# Patient Record
Sex: FEMALE
[De-identification: names, ages, dates, MRNs, and addresses within clinical notes are randomized per-mention and may not be internally consistent; named-entity substitution may affect disease eponyms.]

---

## 2018-02-01 ENCOUNTER — HOSPITAL ENCOUNTER (EMERGENCY)
Dept: HOSPITAL 92 - ERS | Age: 73
Discharge: HOME | End: 2018-02-01
Payer: COMMERCIAL

## 2018-02-01 DIAGNOSIS — J06.9: Primary | ICD-10-CM

## 2018-02-01 DIAGNOSIS — F32.9: ICD-10-CM

## 2018-02-01 DIAGNOSIS — Z86.73: ICD-10-CM

## 2018-02-01 DIAGNOSIS — F17.210: ICD-10-CM

## 2018-02-01 DIAGNOSIS — I10: ICD-10-CM

## 2018-02-01 PROCEDURE — 94640 AIRWAY INHALATION TREATMENT: CPT

## 2018-02-01 PROCEDURE — 71045 X-RAY EXAM CHEST 1 VIEW: CPT

## 2018-02-01 PROCEDURE — 87804 INFLUENZA ASSAY W/OPTIC: CPT

## 2018-02-01 NOTE — RAD
SINGLE VIEW OF THE CHEST:

 

HISTORY:

Dizziness and nausea.

 

COMPARISON: 

05/29/2017

 

FINDINGS:

A single view of the chest shows a normal sized cardiomediastinal silhouette.  There is no evidence o
f consolidation, mass, or pleural effusion.  Degenerative changes are seen in the spine.  The patient
 is status post right shoulder arthroplasty.

 

IMPRESSION:

No evidence of acute cardiopulmonary disease.

 

POS: [facility]

## 2019-03-23 ENCOUNTER — HOSPITAL ENCOUNTER (EMERGENCY)
Dept: HOSPITAL 92 - ERS | Age: 74
LOS: 1 days | Discharge: ANOTHER HOSPITAL | End: 2019-03-24
Payer: MEDICARE

## 2019-03-23 DIAGNOSIS — F32.9: ICD-10-CM

## 2019-03-23 DIAGNOSIS — N39.0: Primary | ICD-10-CM

## 2019-03-23 DIAGNOSIS — F17.210: ICD-10-CM

## 2019-03-23 DIAGNOSIS — I10: ICD-10-CM

## 2019-03-23 LAB
ALBUMIN SERPL BCG-MCNC: 4.1 G/DL (ref 3.4–4.8)
ALP SERPL-CCNC: 104 U/L (ref 40–150)
ALT SERPL W P-5'-P-CCNC: 8 U/L (ref 8–55)
ANION GAP SERPL CALC-SCNC: 12 MMOL/L (ref 10–20)
AST SERPL-CCNC: 12 U/L (ref 5–34)
BACTERIA UR QL AUTO: (no result) HPF
BASOPHILS # BLD AUTO: 0.1 THOU/UL (ref 0–0.2)
BASOPHILS NFR BLD AUTO: 0.9 % (ref 0–1)
BILIRUB SERPL-MCNC: 0.3 MG/DL (ref 0.2–1.2)
BUN SERPL-MCNC: 36 MG/DL (ref 9.8–20.1)
CALCIUM SERPL-MCNC: 9.3 MG/DL (ref 7.8–10.44)
CHLORIDE SERPL-SCNC: 108 MMOL/L (ref 98–107)
CO2 SERPL-SCNC: 23 MMOL/L (ref 23–31)
CREAT CL PREDICTED SERPL C-G-VRATE: 0 ML/MIN (ref 70–130)
CRYSTAL-AUWI FLAG: 0 (ref 0–15)
EOSINOPHIL # BLD AUTO: 0.3 THOU/UL (ref 0–0.7)
EOSINOPHIL NFR BLD AUTO: 3.2 % (ref 0–10)
GLOBULIN SER CALC-MCNC: 3 G/DL (ref 2.4–3.5)
GLUCOSE SERPL-MCNC: 101 MG/DL (ref 83–110)
HEV IGM SER QL: 0 (ref 0–7.99)
HGB BLD-MCNC: 12.8 G/DL (ref 12–16)
HYALINE CASTS #/AREA URNS LPF: (no result) LPF
LYMPHOCYTES # BLD: 2.3 THOU/UL (ref 1.2–3.4)
LYMPHOCYTES NFR BLD AUTO: 22.8 % (ref 21–51)
MCH RBC QN AUTO: 29.5 PG (ref 27–31)
MCV RBC AUTO: 92.7 FL (ref 78–98)
MONOCYTES # BLD AUTO: 0.6 THOU/UL (ref 0.11–0.59)
MONOCYTES NFR BLD AUTO: 6.1 % (ref 0–10)
NEUTROPHILS # BLD AUTO: 6.8 THOU/UL (ref 1.4–6.5)
NEUTROPHILS NFR BLD AUTO: 67.1 % (ref 42–75)
PATHC CAST-AUWI FLAG: 0.54 (ref 0–2.49)
PLATELET # BLD AUTO: 284 THOU/UL (ref 130–400)
POTASSIUM SERPL-SCNC: 4.6 MMOL/L (ref 3.5–5.1)
RBC # BLD AUTO: 4.33 MILL/UL (ref 4.2–5.4)
RBC UR QL AUTO: (no result) HPF (ref 0–3)
SODIUM SERPL-SCNC: 138 MMOL/L (ref 136–145)
SP GR UR STRIP: 1.02 (ref 1–1.04)
SPERM-AUWI FLAG: 0 (ref 0–9.9)
WBC # BLD AUTO: 10.1 THOU/UL (ref 4.8–10.8)
WBC UR QL AUTO: (no result) HPF (ref 0–3)
YEAST-AUWI FLAG: 0 (ref 0–25)

## 2019-03-23 PROCEDURE — 96365 THER/PROPH/DIAG IV INF INIT: CPT

## 2019-03-23 PROCEDURE — 81003 URINALYSIS AUTO W/O SCOPE: CPT

## 2019-03-23 PROCEDURE — 96375 TX/PRO/DX INJ NEW DRUG ADDON: CPT

## 2019-03-23 PROCEDURE — 36415 COLL VENOUS BLD VENIPUNCTURE: CPT

## 2019-03-23 PROCEDURE — 85025 COMPLETE CBC W/AUTO DIFF WBC: CPT

## 2019-03-23 PROCEDURE — 87186 SC STD MICRODIL/AGAR DIL: CPT

## 2019-03-23 PROCEDURE — 93005 ELECTROCARDIOGRAM TRACING: CPT

## 2019-03-23 PROCEDURE — 87086 URINE CULTURE/COLONY COUNT: CPT

## 2019-03-23 PROCEDURE — 96361 HYDRATE IV INFUSION ADD-ON: CPT

## 2019-03-23 PROCEDURE — 80053 COMPREHEN METABOLIC PANEL: CPT

## 2019-03-23 PROCEDURE — 74176 CT ABD & PELVIS W/O CONTRAST: CPT

## 2019-03-23 PROCEDURE — 87077 CULTURE AEROBIC IDENTIFY: CPT

## 2019-03-23 PROCEDURE — 81015 MICROSCOPIC EXAM OF URINE: CPT

## 2019-03-23 PROCEDURE — 84484 ASSAY OF TROPONIN QUANT: CPT

## 2019-03-23 PROCEDURE — 71045 X-RAY EXAM CHEST 1 VIEW: CPT

## 2019-03-23 PROCEDURE — 76705 ECHO EXAM OF ABDOMEN: CPT

## 2019-03-24 NOTE — ULT
RIGHT UPPER QUADRANT ULTRASOUND:

 

DATE: 3/23/2019.

 

COMPARISON: 

None.

 

HISTORY: 

Right flank pain.

 

TECHNIQUE: 

Multiplanar, gray scale, sonographic imaging of the right upper quadrant provided.

 

FINDINGS: 

Imaged pancreas appears grossly unremarkable.  The distal body and tail are obscured by bowel gas.  M
ild prominence of pancreatic duct noted of uncertain clinical significance.  No focal mass lesion or 
intrahepatic biliary dilatation noted within the hepatic parenchyma.  Gallbladder wall is normal in t
hickness with no pericholecystic fluid or gallstones.

 

The common bile duct is upper limits of normal for size, measuring between 6 and 7 mm.  The right chante robb measures 10 cm in craniocaudal dimension.  Question trace fluid adjacent to the right kidney.  No
 sonographic evidence for hydronephrosis, renal stone, or renal mass lesion.  The sonographer reports
 a negative Del Valle's sign.

 

IMPRESSION: 

No evidence for cholelithiasis, cholecystitis, or biliary dilatation.  Question trace perinephric flu
id, which may be associated with inflammatory change of the right kidney.  Clinical correlation requi
red.

 

POS: [facility]

## 2019-03-24 NOTE — CT
CT OF THE ABDOMEN AND PELVIS WITHOUT IV CONTRAST:

 

INDICATION: 

Right upper quadrant abdominal pain.

 

COMPARISON: 

Prior exam dated 5/29/2017.

 

FINDINGS: 

No renal or ureteral calculus is evident.  No hydronephrosis is evident.  There is bilateral fetal lo
bulation.  There is bibasilar atelectasis.  An unopacified liver, pancreas, adrenal glands, and splee
n appear within normal limits.  There is a mild amount of retained stool within the colon.  There is 
a normal appendix in the right lower quadrant.  Visualized bladder is unremarkable.  There is a gas-f
illed pessary within the lower vagina.  The rectum and perirectal soft tissues are unremarkable.  The
re is diffuse osteopenia and scattered degenerative change.  No definite acute osseous abnormality is
 evident.

 

IMPRESSION: 

No CT explanation for the patient's abdominal pain.

 

POS: [facility]

## 2019-03-24 NOTE — RAD
FRONTAL RADIOGRAPH CHEST:

 

DATE: 3/23/2019.

 

COMPARISON: 

2/1/2018.

 

HISTORY: 

Right flank pain.

 

FINDINGS: 

Cardiac silhouette appears prominent, a stable finding.  There is a right shoulder arthroplasty.  The
re is increased linear interstitial density with pulmonary hyperinflation suggesting air trapping.  Q
uestion a history of COPD.

 

IMPRESSION: 

Stable appearance of the chest - no acute findings.

 

POS: [facility]

## 2019-04-18 ENCOUNTER — HOSPITAL ENCOUNTER (EMERGENCY)
Dept: HOSPITAL 92 - ERS | Age: 74
Discharge: HOME | End: 2019-04-18
Payer: MEDICARE
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
## 2019-04-18 DIAGNOSIS — S40.011A: ICD-10-CM

## 2019-04-18 DIAGNOSIS — Z86.73: ICD-10-CM

## 2019-04-18 DIAGNOSIS — F32.9: ICD-10-CM

## 2019-04-18 DIAGNOSIS — S20.219A: Primary | ICD-10-CM

## 2019-04-18 DIAGNOSIS — I10: ICD-10-CM

## 2019-04-18 DIAGNOSIS — F17.210: ICD-10-CM

## 2019-04-18 PROCEDURE — 96372 THER/PROPH/DIAG INJ SC/IM: CPT

## 2019-04-18 PROCEDURE — 71046 X-RAY EXAM CHEST 2 VIEWS: CPT

## 2019-04-18 NOTE — RAD
XR Shoulder Rt 3 View STANDARD: 4/18/2019 12:00 PM



CLINICAL INDICATION: Pain, injury



COMPARISON: None.



FINDINGS:

Fracture:No fracture.

Arthropathy:Postoperative right shoulder in place. Moderate osteoarthritis of right AC joint.

Incidental findings:None of significance.



  

IMPRESSION: 



1. No acute osseous abnormality. 



Reported By: Curly Rico 

Electronically Signed:  4/18/2019 12:49 PM

## 2019-04-18 NOTE — RAD
Exam: Chest 2 views



HISTORY:Injury, pain



Comparison: None



FINDINGS:



Lungs: Hyperinflated with interstitial prominence bilaterally

Cardiac silhouette:Enlarged

Pulmonary vessels: Mild engorgement

Pleural Spaces: Clear

Pneumothorax: None



Osseous abnormalities: None of acuity.



IMPRESSION: 

COPD



Mild CHF



Reported By: Curly Rico 

Electronically Signed:  4/18/2019 12:50 PM

## 2019-08-19 ENCOUNTER — HOSPITAL ENCOUNTER (OUTPATIENT)
Dept: HOSPITAL 92 - ERS | Age: 74
Setting detail: OBSERVATION
LOS: 2 days | Discharge: HOME | End: 2019-08-21
Attending: HOSPITALIST | Admitting: HOSPITALIST
Payer: MEDICARE

## 2019-08-19 VITALS — BODY MASS INDEX: 29.5 KG/M2

## 2019-08-19 DIAGNOSIS — Z91.81: ICD-10-CM

## 2019-08-19 DIAGNOSIS — Z86.73: ICD-10-CM

## 2019-08-19 DIAGNOSIS — F17.210: ICD-10-CM

## 2019-08-19 DIAGNOSIS — E78.5: ICD-10-CM

## 2019-08-19 DIAGNOSIS — Z79.899: ICD-10-CM

## 2019-08-19 DIAGNOSIS — F32.9: ICD-10-CM

## 2019-08-19 DIAGNOSIS — I16.0: Primary | ICD-10-CM

## 2019-08-19 DIAGNOSIS — I10: ICD-10-CM

## 2019-08-19 LAB
ALBUMIN SERPL BCG-MCNC: 4.2 G/DL (ref 3.4–4.8)
ALP SERPL-CCNC: 109 U/L (ref 40–150)
ALT SERPL W P-5'-P-CCNC: 12 U/L (ref 8–55)
ANION GAP SERPL CALC-SCNC: 16 MMOL/L (ref 10–20)
AST SERPL-CCNC: 21 U/L (ref 5–34)
BASOPHILS # BLD AUTO: 0 THOU/UL (ref 0–0.2)
BASOPHILS NFR BLD AUTO: 0.6 % (ref 0–1)
BILIRUB SERPL-MCNC: 0.3 MG/DL (ref 0.2–1.2)
BUN SERPL-MCNC: 23 MG/DL (ref 9.8–20.1)
CALCIUM SERPL-MCNC: 9.2 MG/DL (ref 7.8–10.44)
CHLORIDE SERPL-SCNC: 109 MMOL/L (ref 98–107)
CO2 SERPL-SCNC: 20 MMOL/L (ref 23–31)
CREAT CL PREDICTED SERPL C-G-VRATE: 0 ML/MIN (ref 70–130)
EOSINOPHIL # BLD AUTO: 0.1 THOU/UL (ref 0–0.7)
EOSINOPHIL NFR BLD AUTO: 1.8 % (ref 0–10)
GLOBULIN SER CALC-MCNC: 3.2 G/DL (ref 2.4–3.5)
GLUCOSE SERPL-MCNC: 86 MG/DL (ref 83–110)
HGB BLD-MCNC: 13.9 G/DL (ref 12–16)
LYMPHOCYTES # BLD: 1.6 THOU/UL (ref 1.2–3.4)
LYMPHOCYTES NFR BLD AUTO: 19.8 % (ref 21–51)
MCH RBC QN AUTO: 31.9 PG (ref 27–31)
MCV RBC AUTO: 95.7 FL (ref 78–98)
MONOCYTES # BLD AUTO: 0.5 THOU/UL (ref 0.11–0.59)
MONOCYTES NFR BLD AUTO: 6.1 % (ref 0–10)
NEUTROPHILS # BLD AUTO: 5.9 THOU/UL (ref 1.4–6.5)
NEUTROPHILS NFR BLD AUTO: 71.7 % (ref 42–75)
PLATELET # BLD AUTO: 270 THOU/UL (ref 130–400)
POTASSIUM SERPL-SCNC: 4.4 MMOL/L (ref 3.5–5.1)
RBC # BLD AUTO: 4.37 MILL/UL (ref 4.2–5.4)
SODIUM SERPL-SCNC: 141 MMOL/L (ref 136–145)
WBC # BLD AUTO: 8.3 THOU/UL (ref 4.8–10.8)

## 2019-08-19 PROCEDURE — 97139 UNLISTED THERAPEUTIC PX: CPT

## 2019-08-19 PROCEDURE — 84439 ASSAY OF FREE THYROXINE: CPT

## 2019-08-19 PROCEDURE — 36415 COLL VENOUS BLD VENIPUNCTURE: CPT

## 2019-08-19 PROCEDURE — 80048 BASIC METABOLIC PNL TOTAL CA: CPT

## 2019-08-19 PROCEDURE — 96376 TX/PRO/DX INJ SAME DRUG ADON: CPT

## 2019-08-19 PROCEDURE — 80053 COMPREHEN METABOLIC PANEL: CPT

## 2019-08-19 PROCEDURE — 96374 THER/PROPH/DIAG INJ IV PUSH: CPT

## 2019-08-19 PROCEDURE — 84484 ASSAY OF TROPONIN QUANT: CPT

## 2019-08-19 PROCEDURE — 80061 LIPID PANEL: CPT

## 2019-08-19 PROCEDURE — 99285 EMERGENCY DEPT VISIT HI MDM: CPT

## 2019-08-19 PROCEDURE — G0378 HOSPITAL OBSERVATION PER HR: HCPCS

## 2019-08-19 PROCEDURE — 71275 CT ANGIOGRAPHY CHEST: CPT

## 2019-08-19 PROCEDURE — 96372 THER/PROPH/DIAG INJ SC/IM: CPT

## 2019-08-19 PROCEDURE — 71046 X-RAY EXAM CHEST 2 VIEWS: CPT

## 2019-08-19 PROCEDURE — 82553 CREATINE MB FRACTION: CPT

## 2019-08-19 PROCEDURE — 85025 COMPLETE CBC W/AUTO DIFF WBC: CPT

## 2019-08-19 PROCEDURE — 96375 TX/PRO/DX INJ NEW DRUG ADDON: CPT

## 2019-08-19 PROCEDURE — 84443 ASSAY THYROID STIM HORMONE: CPT

## 2019-08-19 PROCEDURE — 83880 ASSAY OF NATRIURETIC PEPTIDE: CPT

## 2019-08-19 PROCEDURE — 85379 FIBRIN DEGRADATION QUANT: CPT

## 2019-08-19 PROCEDURE — 93005 ELECTROCARDIOGRAM TRACING: CPT

## 2019-08-19 NOTE — CT
CTA Angio Chest W WO Con



8/19/2019 10:53 PM



Indication:  Chest pain and dyspnea



Technique:  Multiple CTA images were obtained of the thorax with IV contrast.  3D reformatted images 
were constructed from the raw data.



Comparison:  None



Findings:



Pulmonary arteries:  No central or segmental pulmonary embolus is evident.



Heart and Great Vessels:  There are mild vascular calcifications involving thoracic aorta and coronar
y arteries.



Lungs:There are areas of subsegmental volume loss within the lingula and right middle lobe. No conflu
ent airspace opacity is evident.



Pleural space:  Clear.



Upper Abdomen:  No acute abnormality.



Osseous Structures:  No acute osseous abnormality.



Impression:

No central or segmental pulmonary embolus.





Reported By: Adán Prieto 

Electronically Signed:  8/19/2019 11:27 PM

## 2019-08-19 NOTE — RAD
EXAM:

Chest Two Views



8/19/2019 6:16 PM



HISTORY:

Fall with chest pain



COMPARISON:

April 18, 2019



FINDINGS:

Heart: Stable mild cardiomegaly

Pulmonary vessels: Normal.

Costophrenic angles: Clear.

Lungs: Stable COPD

Pneumothorax: None.

Osseous structures:Stable right total shoulder prosthesis. There is scattered degenerative and osteoa
rthritic change present. Mild thoracolumbar scoliosis.

Additional findings:   None.



IMPRESSION:

No significant acute intrathoracic disease.



Reported By: Adán Prieto 

Electronically Signed:  8/19/2019 6:17 PM

## 2019-08-20 LAB
ANION GAP SERPL CALC-SCNC: 13 MMOL/L (ref 10–20)
BASOPHILS # BLD AUTO: 0 THOU/UL (ref 0–0.2)
BASOPHILS NFR BLD AUTO: 0.5 % (ref 0–1)
BUN SERPL-MCNC: 19 MG/DL (ref 9.8–20.1)
CALCIUM SERPL-MCNC: 8.6 MG/DL (ref 7.8–10.44)
CHD RISK SERPL-RTO: 1.8 (ref ?–4.5)
CHLORIDE SERPL-SCNC: 106 MMOL/L (ref 98–107)
CHOLEST SERPL-MCNC: 217 MG/DL
CK MB SERPL-MCNC: 1.8 NG/ML (ref 0–6.6)
CO2 SERPL-SCNC: 20 MMOL/L (ref 23–31)
CREAT CL PREDICTED SERPL C-G-VRATE: 67 ML/MIN (ref 70–130)
EOSINOPHIL # BLD AUTO: 0.1 THOU/UL (ref 0–0.7)
EOSINOPHIL NFR BLD AUTO: 1.8 % (ref 0–10)
GLUCOSE SERPL-MCNC: 151 MG/DL (ref 83–110)
HDLC SERPL-MCNC: 121 MG/DL
HGB BLD-MCNC: 13.1 G/DL (ref 12–16)
LDLC SERPL CALC-MCNC: 81 MG/DL
LYMPHOCYTES # BLD: 1 THOU/UL (ref 1.2–3.4)
LYMPHOCYTES NFR BLD AUTO: 14.5 % (ref 21–51)
MCH RBC QN AUTO: 30.2 PG (ref 27–31)
MCV RBC AUTO: 94.2 FL (ref 78–98)
MONOCYTES # BLD AUTO: 0.4 THOU/UL (ref 0.11–0.59)
MONOCYTES NFR BLD AUTO: 5.5 % (ref 0–10)
NEUTROPHILS # BLD AUTO: 5.5 THOU/UL (ref 1.4–6.5)
NEUTROPHILS NFR BLD AUTO: 77.7 % (ref 42–75)
PLATELET # BLD AUTO: 256 THOU/UL (ref 130–400)
POTASSIUM SERPL-SCNC: 3.4 MMOL/L (ref 3.5–5.1)
RBC # BLD AUTO: 4.34 MILL/UL (ref 4.2–5.4)
SODIUM SERPL-SCNC: 136 MMOL/L (ref 136–145)
T4 FREE SERPL-MCNC: 0.95 NG/DL (ref 0.7–1.48)
TRIGL SERPL-MCNC: 62 MG/DL (ref ?–150)
TROPONIN I SERPL DL<=0.01 NG/ML-MCNC: 0.05 NG/ML (ref ?–0.03)
TSH SERPL DL<=0.005 MIU/L-ACNC: 1.41 UIU/ML (ref 0.35–4.94)
WBC # BLD AUTO: 7.1 THOU/UL (ref 4.8–10.8)

## 2019-08-20 RX ADMIN — Medication SCH ML: at 21:14

## 2019-08-21 VITALS — TEMPERATURE: 97.6 F

## 2019-08-21 VITALS — DIASTOLIC BLOOD PRESSURE: 77 MMHG | SYSTOLIC BLOOD PRESSURE: 158 MMHG

## 2019-08-21 LAB
ANION GAP SERPL CALC-SCNC: 13 MMOL/L (ref 10–20)
BASOPHILS # BLD AUTO: 0.1 THOU/UL (ref 0–0.2)
BASOPHILS NFR BLD AUTO: 1.1 % (ref 0–1)
BUN SERPL-MCNC: 13 MG/DL (ref 9.8–20.1)
CALCIUM SERPL-MCNC: 9.2 MG/DL (ref 7.8–10.44)
CHLORIDE SERPL-SCNC: 109 MMOL/L (ref 98–107)
CO2 SERPL-SCNC: 21 MMOL/L (ref 23–31)
CREAT CL PREDICTED SERPL C-G-VRATE: 85 ML/MIN (ref 70–130)
EOSINOPHIL # BLD AUTO: 0.2 THOU/UL (ref 0–0.7)
EOSINOPHIL NFR BLD AUTO: 2.6 % (ref 0–10)
GLUCOSE SERPL-MCNC: 85 MG/DL (ref 83–110)
HGB BLD-MCNC: 14.2 G/DL (ref 12–16)
LYMPHOCYTES # BLD: 1.9 THOU/UL (ref 1.2–3.4)
LYMPHOCYTES NFR BLD AUTO: 26.8 % (ref 21–51)
MCH RBC QN AUTO: 32.5 PG (ref 27–31)
MCV RBC AUTO: 94.7 FL (ref 78–98)
MONOCYTES # BLD AUTO: 0.5 THOU/UL (ref 0.11–0.59)
MONOCYTES NFR BLD AUTO: 6.7 % (ref 0–10)
NEUTROPHILS # BLD AUTO: 4.5 THOU/UL (ref 1.4–6.5)
NEUTROPHILS NFR BLD AUTO: 62.9 % (ref 42–75)
PLATELET # BLD AUTO: 262 THOU/UL (ref 130–400)
POTASSIUM SERPL-SCNC: 4.2 MMOL/L (ref 3.5–5.1)
RBC # BLD AUTO: 4.37 MILL/UL (ref 4.2–5.4)
SODIUM SERPL-SCNC: 139 MMOL/L (ref 136–145)
WBC # BLD AUTO: 7.2 THOU/UL (ref 4.8–10.8)

## 2019-08-21 RX ADMIN — Medication SCH ML: at 09:22

## 2019-08-21 NOTE — HP
PRIMARY CARE PHYSICIAN:  Dr. Vipin Cifuentes.



CHIEF COMPLAINT:  An elevated blood pressure.



HISTORY OF PRESENT ILLNESS:  Ms. Lizzette Knight is a pleasant 74-year-old female

with past medical history of hypertension, hyperlipidemia, and a transient ischemic

attack, who had presented to [facility] late last night.

She states that she had a mechanical fall on Sunday, which she was walking and had

rolled her left ankle where she had suffered a fall secondary to a mechanical fall.

She states when she was on the ground, she had some slight chest pain and some

shortness of breath due to the impact.  However, these symptoms resolved.  She

states that when she and her family were taken to a local clinic, she underwent an

x-ray, which was found to be normal and had showed no fracture.  It was at that time

that the staff noticed that her blood pressure was high and had recommended her to

be seen in the ER.  She then was transported to the local ER at [facility].  There was also determined that her blood pressure is

elevated in the 200s over 100s and got as high as 205/112.  She had received an IV

10 mg push of labetalol, sublingual nitroglycerin, aspirin and 1 inch of transdermal

nitroglycerin paste, which had seemed to help with her symptoms.  The patient denied

any fever, chills, any headache, blurred vision, dizziness, any chest pain,

palpitations, shortness of breath, abdominal pain, nausea, vomiting, or any other

symptoms at that time.  Her serial troponins were trended and found to be normal

originally and had elevated to an indeterminate range of 0.030 and 0.049; however,

did trend down to 0.023 after her blood pressure improved.  The patient had no

further symptoms of chest pain at that time.  However, the D-dimer was slightly

elevated at 0.56.  The CTA of chest was also performed and found no signs of PE at

this time.  A portable chest x-ray was also performed and showed no significant

acute intrathoracic disease.  The patient's TSH and T4 were both normal and all

other lab work was essentially unremarkable.  She was started on IV hydralazine and

IV labetalol as needed for any further elevated blood pressures.  Repeat blood

pressures did improve to the systolic 160s or 170s and then later 136/62. 



REVIEW OF SYSTEMS:  All other systems reviewed and found to be negative unless

mentioned in the HPI. 



PAST MEDICAL HISTORY:  Hypertension, hyperlipidemia, transient ischemic attack.



PAST SURGICAL HISTORY:  Right arm reconstruction and hernia repair in 2000.



PSYCHIATRIC HISTORY:  Includes depression.



SOCIAL HISTORY:  The patient reports drinking roughly 2 to 3 times a week socially

and also states that she smokes about a pack of cigarettes per week; however, denies

any further illicit drug use. 



KNOWN ALLERGIES:  No known drug allergies.



CURRENT HOME MEDICATIONS:  

1. Diltiazem 180 mg p.o. daily.

2. Trazodone 50 mg p.o. at bedtime.

3. Ibuprofen 800 mg p.o. q.8 hours p.r.n. pain.



PHYSICAL EXAMINATION:

VITAL SIGNS:  /62, pulse 78, respirations 20, temperature 97.7, O2 saturation

94% on room air. 

GENERAL:  The patient is awake, alert, and oriented x3.  She is currently lying

comfortably in bed and in no acute distress.  Her family is at bedside. 

HEENT:  Atraumatic, normocephalic.  Pupils are round and reactive to light.

Extraocular muscles intact.  Moist mucous membranes noted. 

NECK:  Soft, supple.  Trachea midline. 

CARDIOVASCULAR:  Positive S1 and S2.  Regular rate and rhythm.  No murmur

auscultated. 

RESPIRATORY:  Clear to auscultation bilaterally.  No wheezes, rales, or rhonchi. 

ABDOMEN:  Soft, nontender.  Bowel sounds present. 

MUSCULOSKELETAL:  Strength 5+ bilaterally upper and lower extremities.  Moves all

extremities equal. Left ankle is nontender to palpation.  No edema noted. 

NEUROLOGIC:  Cranial nerves 2 through 12 grossly intact.  No focal deficits noted.

Speech intact and normal.  Gait not assessed. 

SKIN:  Warm, dry, and intact.  No rashes.  No ulceration noted. 

PSYCHIATRIC:  Good mood and affect.



LABORATORY DATA:  WBC 27.1, RBC 4.34, hemoglobin 13.1, platelets 256.  D-dimer 0.56.

 Sodium 136, potassium 3.4, anion gap 13, BUN 19, creatinine 0.80, estimated GFR is

70, glucose 151.  Troponin 0.023, 0.030, 0.049, 0.023.  BNP 56.9, CK-MB 1.8,

triglycerides 62, cholesterol 217, LDL 81, , free T4 0.95.  TSH 1.4062. 



DIAGNOSTIC IMAGING:  Two-view chest x-ray shows no significant acute changes.  CTA

of chest showed no signs of PE at this time. 



ASSESSMENT AND PLAN:  

1. Hypertensive urgency.  She will be resumed on her home regimen at this time.  Her

blood pressure and other vital signs will be monitored closely and she will also be

treated with IV hydralazine and labetalol as needed for any further elevated blood

pressures, these have been trending down and have been normalized.  She will be

likely monitored overnight and if she is stable tomorrow, she will likely be

discharged in the morning.  She is currently asymptomatic at this time and her

troponins also trended down.  She has no longer had any further chest pain,

palpitations, or shortness of breath and normal EKG.  Therefore, likely no further

cardiac workup at this time is needed. 

2. History of hypertension as above.

3. Hyperlipidemia.

4. Deep venous thrombosis and gastrointestinal prophylaxis.

5. Code status, full code.  

6. Surrogate decision maker is her daughter, Gianluca.



DISPOSITION:  Pending further workup and clinical findings.







Job ID:  [id number]

## 2019-08-24 NOTE — EKG
Test Reason : CP

Blood Pressure : ***/*** mmHG

Vent. Rate : 080 BPM     Atrial Rate : 080 BPM

   P-R Int : 178 ms          QRS Dur : 090 ms

    QT Int : 390 ms       P-R-T Axes : 062 021 056 degrees

   QTc Int : 449 ms

 

Normal sinus rhythm

Possible Left atrial enlargement

Left ventricular hypertrophy

 

 

Confirmed by VAL BETANCUR ([id number]),  MEKHI VILLEDA ([id number]) on 8/24/2019 3:06:28 PM

 

Referred By:             Confirmed By:VAL BETANCUR